# Patient Record
Sex: MALE | Race: OTHER | HISPANIC OR LATINO
[De-identification: names, ages, dates, MRNs, and addresses within clinical notes are randomized per-mention and may not be internally consistent; named-entity substitution may affect disease eponyms.]

---

## 2022-06-14 ENCOUNTER — APPOINTMENT (OUTPATIENT)
Dept: INTERNAL MEDICINE | Facility: CLINIC | Age: 52
End: 2022-06-14
Payer: MEDICAID

## 2022-06-14 ENCOUNTER — NON-APPOINTMENT (OUTPATIENT)
Age: 52
End: 2022-06-14

## 2022-06-14 VITALS
DIASTOLIC BLOOD PRESSURE: 90 MMHG | HEIGHT: 67 IN | WEIGHT: 180 LBS | SYSTOLIC BLOOD PRESSURE: 140 MMHG | BODY MASS INDEX: 28.25 KG/M2 | OXYGEN SATURATION: 98 % | HEART RATE: 90 BPM

## 2022-06-14 DIAGNOSIS — Z00.00 ENCOUNTER FOR GENERAL ADULT MEDICAL EXAMINATION W/OUT ABNORMAL FINDINGS: ICD-10-CM

## 2022-06-14 DIAGNOSIS — I10 ESSENTIAL (PRIMARY) HYPERTENSION: ICD-10-CM

## 2022-06-14 DIAGNOSIS — N63.0 UNSPECIFIED LUMP IN UNSPECIFIED BREAST: ICD-10-CM

## 2022-06-14 PROCEDURE — 99203 OFFICE O/P NEW LOW 30 MIN: CPT

## 2022-06-14 NOTE — ASSESSMENT
[FreeTextEntry1] : Concerned about the finding of a hard mass palpable and breast exam. Patient will be referred for a mammogram. In terms of the right shoulder pain I suspect this is due to tendinitis of the shoulder. He will be referred to orthopedics. Patient will call me for the results of the mammogram.

## 2022-06-14 NOTE — PHYSICAL EXAM
[No Acute Distress] : no acute distress [Well Nourished] : well nourished [Well Developed] : well developed [Well-Appearing] : well-appearing [Normal Sclera/Conjunctiva] : normal sclera/conjunctiva [PERRL] : pupils equal round and reactive to light [EOMI] : extraocular movements intact [Normal Outer Ear/Nose] : the outer ears and nose were normal in appearance [Normal Oropharynx] : the oropharynx was normal [No JVD] : no jugular venous distention [No Lymphadenopathy] : no lymphadenopathy [Supple] : supple [Thyroid Normal, No Nodules] : the thyroid was normal and there were no nodules present [No Respiratory Distress] : no respiratory distress  [No Accessory Muscle Use] : no accessory muscle use [Clear to Auscultation] : lungs were clear to auscultation bilaterally [Normal Rate] : normal rate  [Regular Rhythm] : with a regular rhythm [Normal S1, S2] : normal S1 and S2 [No Murmur] : no murmur heard [No Carotid Bruits] : no carotid bruits [No Abdominal Bruit] : a ~M bruit was not heard ~T in the abdomen [No Varicosities] : no varicosities [Pedal Pulses Present] : the pedal pulses are present [No Edema] : there was no peripheral edema [No Palpable Aorta] : no palpable aorta [No Extremity Clubbing/Cyanosis] : no extremity clubbing/cyanosis [Soft] : abdomen soft [Non Tender] : non-tender [Non-distended] : non-distended [No Masses] : no abdominal mass palpated [No HSM] : no HSM [Normal Bowel Sounds] : normal bowel sounds [Normal Posterior Cervical Nodes] : no posterior cervical lymphadenopathy [Normal Anterior Cervical Nodes] : no anterior cervical lymphadenopathy [Grossly Normal Strength/Tone] : grossly normal strength/tone [No Rash] : no rash [Coordination Grossly Intact] : coordination grossly intact [No Focal Deficits] : no focal deficits [Normal Gait] : normal gait [Normal Affect] : the affect was normal [Deep Tendon Reflexes (DTR)] : deep tendon reflexes were 2+ and symmetric [Normal Insight/Judgement] : insight and judgment were intact [de-identified] : hard mass palpable directly posterior to nipple

## 2022-07-08 ENCOUNTER — RESULT REVIEW (OUTPATIENT)
Age: 52
End: 2022-07-08

## 2023-03-06 PROBLEM — Z86.79 HISTORY OF HYPERTENSION: Status: RESOLVED | Noted: 2023-03-06 | Resolved: 2023-03-06

## 2023-03-06 PROBLEM — Z87.438 HISTORY OF BENIGN PROSTATIC HYPERPLASIA: Status: RESOLVED | Noted: 2023-03-06 | Resolved: 2023-03-06

## 2023-03-08 ENCOUNTER — APPOINTMENT (OUTPATIENT)
Dept: GASTROENTEROLOGY | Facility: CLINIC | Age: 53
End: 2023-03-08
Payer: MEDICAID

## 2023-03-08 VITALS
DIASTOLIC BLOOD PRESSURE: 82 MMHG | BODY MASS INDEX: 29.13 KG/M2 | SYSTOLIC BLOOD PRESSURE: 140 MMHG | OXYGEN SATURATION: 99 % | WEIGHT: 186 LBS | HEART RATE: 79 BPM

## 2023-03-08 DIAGNOSIS — Z86.79 PERSONAL HISTORY OF OTHER DISEASES OF THE CIRCULATORY SYSTEM: ICD-10-CM

## 2023-03-08 DIAGNOSIS — Z87.438 PERSONAL HISTORY OF OTHER DISEASES OF MALE GENITAL ORGANS: ICD-10-CM

## 2023-03-08 DIAGNOSIS — Z12.11 ENCOUNTER FOR SCREENING FOR MALIGNANT NEOPLASM OF COLON: ICD-10-CM

## 2023-03-08 DIAGNOSIS — K62.5 HEMORRHAGE OF ANUS AND RECTUM: ICD-10-CM

## 2023-03-08 PROCEDURE — 99204 OFFICE O/P NEW MOD 45 MIN: CPT

## 2023-03-08 RX ORDER — LOSARTAN POTASSIUM 50 MG/1
50 TABLET, FILM COATED ORAL
Refills: 0 | Status: ACTIVE | COMMUNITY

## 2023-03-08 RX ORDER — HYDROCHLOROTHIAZIDE 25 MG/1
25 TABLET ORAL
Refills: 0 | Status: ACTIVE | COMMUNITY

## 2023-03-08 NOTE — PHYSICAL EXAM
[Alert] : alert [Sclera] : the sclera and conjunctiva were normal [Normal Appearance] : the appearance of the neck was normal [No Respiratory Distress] : no respiratory distress [None] : no edema [Abdomen Soft] : soft [Abnormal Walk] : normal gait [Normal Color / Pigmentation] : normal skin color and pigmentation [No Focal Deficits] : no focal deficits [Oriented To Time, Place, And Person] : oriented to person, place, and time [de-identified] : Deferred pending colonoscopy

## 2023-03-08 NOTE — HISTORY OF PRESENT ILLNESS
[FreeTextEntry1] : JOSH SANTIAGORIOS  is being evaluated at the request of Dr. VIVIAN Givens for an opinion re: colon cancer screening\par Additional;ly admits  to occasional BRBPR.  Denies nausea, vomitng, fever, chills, diarrhea, constipation,. melena, hematemesis, BRBPR, unexpected weight  loss, GERD

## 2023-03-08 NOTE — CONSULT LETTER
[Dear  ___] : Dear  [unfilled], [Consult Letter:] : I had the pleasure of evaluating your patient, [unfilled]. [Please see my note below.] : Please see my note below. [Consult Closing:] : Thank you very much for allowing me to participate in the care of this patient.  If you have any questions, please do not hesitate to contact me. [Sincerely,] : Sincerely, [FreeTextEntry3] : Feliciano Stevens MD\par tel: [phone]\par fax: [phone]\par

## 2023-03-28 ENCOUNTER — RESULT REVIEW (OUTPATIENT)
Age: 53
End: 2023-03-28

## 2023-03-29 ENCOUNTER — APPOINTMENT (OUTPATIENT)
Dept: GASTROENTEROLOGY | Facility: HOSPITAL | Age: 53
End: 2023-03-29

## 2024-09-17 ENCOUNTER — APPOINTMENT (OUTPATIENT)
Dept: NEUROLOGY | Facility: CLINIC | Age: 54
End: 2024-09-17
Payer: COMMERCIAL

## 2024-09-17 VITALS
OXYGEN SATURATION: 97 % | WEIGHT: 184 LBS | BODY MASS INDEX: 28.88 KG/M2 | DIASTOLIC BLOOD PRESSURE: 82 MMHG | SYSTOLIC BLOOD PRESSURE: 129 MMHG | HEART RATE: 88 BPM | HEIGHT: 67 IN

## 2024-09-17 DIAGNOSIS — R42 DIZZINESS AND GIDDINESS: ICD-10-CM

## 2024-09-17 DIAGNOSIS — H81.10 BENIGN PAROXYSMAL VERTIGO, UNSPECIFIED EAR: ICD-10-CM

## 2024-09-17 PROCEDURE — 99205 OFFICE O/P NEW HI 60 MIN: CPT

## 2024-09-17 RX ORDER — MECLIZINE HYDROCHLORIDE 25 MG/1
25 TABLET ORAL TWICE DAILY
Qty: 20 | Refills: 0 | Status: ACTIVE | COMMUNITY
Start: 2024-09-17 | End: 1900-01-01

## 2024-09-17 NOTE — HISTORY OF PRESENT ILLNESS
[FreeTextEntry1] : Mr. JOSH DIETRICH is a 54 year old male here today in neurology consultation for symptoms of vertigo. Hx htn,    JOSH is escorted by his wife. He reports 2 weeks of dizziness. He reports arriving home from work about 2 weeks ago when he began to feel significant dizziness. The symptoms of dizziness lessened then increased again 3 days later prompting pt to go to [facility] ER; work up negative. Pt works in a restaurant. Notably pt reports hx of chronic dizziness for many years for which OTC medications (unknown which) have been helpful. Pt reports feeling like his head was moving side to side x 12 hours then became milder. No room spinning. Reports drinking lots of fluids during the day.   9/2/24 [facility] ER for dizziness. Work up was unrevealing. D/c home with meclizine prn vertigo; effective.   9/2/24 Cr 1.2, GFR 67, A1C 5.9, LDL 71, Lyme negative.  9/2/24 CT HEAD - No evidence of acute intracranial pathology.  9/2/24 CT PERFUSION:   No convincing evidence of acute core infarct or ischemic penumbra. MRI brain recommended if  symptoms persist.        9/2/24 CTA NECK:    No evidence of significant stenosis or occlusion.       9/2/24 CTA HEAD:    No large vessel occlusion, significant stenosis or vascular abnormality identified.    PCP at [facility]    9/2/2024 [facility] ED VISIT  54-year-old male presents to emergency room with complaints of acute onset of dizziness. Patient walked into emergency room. In triage noted to be weak. Code stroke was called patient was brought to room 1. History exam obtained via interpretation. Patient is following commands upon arrival to emergency room NIH score 0. Stroke protocol was followed patient underwent CT of the head, CT of cerebral arteries and neck vessels with contrast. Results were reviewed.  Emergency room patient treated with Reglan meclizine and fluids. Laboratory results were reviewed. Results were discussed with patient and wife via interpretation explained what happened to patient. Results of the stroke protocol CT were reviewed and discussed explained to patient outpatient follow-up with neurologist as recommended. Patient would benefit with outpatient stroke follow-up and workup even though at present patients presentation seems more suspicious for vertigo.

## 2024-09-17 NOTE — ADDENDUM
[FreeTextEntry1] : Chart review done. Medication reconciliation completed. Medication education provided. Chronic medical condition education reviewed. Fall and safety precautions reinforced. Instructed pt/surrogate to call office for questions or concerns. Schedule follow-up appointment as recommended. Referrals and testing ordered where indicated. Plan of care reviewed, and questions answered.

## 2024-09-17 NOTE — REASON FOR VISIT
[Consultation] : a consultation visit [Time Spent: ____ minutes] : Total time spent using  services: [unfilled] minutes. The patient's primary language is not English thus required  services. [Interpreters_IDNumber] : [id number] [Interpreters_FullName] : Anil [TWNoteComboBox1] : [place]

## 2024-09-17 NOTE — ASSESSMENT
[FreeTextEntry1] : Mr. JOSH DIETRICH is a 54 year old male here in neurology consultation for symptoms of dizziness that worsen with head movement. Pt reports experiencing mild symptoms of dizziness for many years for which he typically took OTC medications (unknown name) with improvement. A more significant episode of dizziness occurred 9/2 prompting pt to go to [facility] ER where work up was unrevealing. Meclizine prescribed was effective. Dizziness is improved though not resolved. DDx BPPV vs structural brain abnormality vs mild dehydration   PLAN OF CARE - MRI brain waw Vest[facility] rehab  Meclizine 25mg 2x daily prn dizziness  Water - goal 64 oz daily   Neurology f/u in 3 months with Brinda Mcrae NP   Recommendations for Brain Health - - healthy eating/diet with diet rich in fiber, fruits, vegetables, and low in saturated fats, processed foods. - good sleep, 7-8 hours a night. No use of phone or watching television before bed. - aerobic exercise, at least 30 minutes, such as a brisk walk 3-4 times a week. - keep mind active with puzzles, reading, and socializing with others. - abstaining from excess alcohol, or drug use. - blood pressure, cholesterol, blood glucose monitoring to prevent microvascular disease which can lead to cognitive impairment. - obtain adequate water intake. 8 cups of water daily or as recommended by your pcp if fluid restricted related to medical condition.

## 2024-09-17 NOTE — PHYSICAL EXAM
[FreeTextEntry1] : Physical Exam In NAD Mood appropriate Head turn to R and L did not trigger dizziness today  No neck tenderness    Mental Status Awake, alert and oriented to person, place, time and situation. Provides detailed history. Fluent Speech.   Cranial Nerves II: VFF III, IV, VI: PERRL, EOMI. V: Facial sensation is normal B/L. VII: Facial strength is symmetric. VIII: wnl to voice.  IX, X: Palate is midline and elevates symmetrically. XI: Trapezius normal strength. XII: Tongue midline without atrophy or fasciculations.  MOTOR EXAM Muscle tone - no evidence of rigidity or resistance in all 4 extremities. No atrophy or fasciculations. Muscle Strength: arms and legs, proximal and distal flexors and extensors are normal. No UE drift.   REFLEXES All present, normal, and symmetrical Right Plantar: mute. Left Plantar: mute.   COORDINATION Finger to nose: Normal. Heel to shin:    SENSORY Vibration: intact  Sensation: light touch, pin prick distribution and cold intact   GAIT Normal. Tandem walk normal. Romberg negative.

## 2024-09-17 NOTE — CONSULT LETTER
[Courtesy Letter:] : I had the pleasure of seeing your patient, [unfilled], in my office today. [Please see my note below.] : Please see my note below. [Sincerely,] : Sincerely, [FreeTextEntry2] : Attn [facility]  [FreeTextEntry3] : Brinda Mcrae NP

## 2024-09-17 NOTE — DATA REVIEWED
[de-identified] : 9/2/24 Cr 1.2, GFR 67, A1C 5.9, LDL 71, Lyme negative.  9/2/24 CT HEAD - No evidence of acute intracranial pathology.  9/2/24 CT PERFUSION:   No convincing evidence of acute core infarct or ischemic penumbra. MRI brain recommended if  symptoms persist.        9/2/24 CTA NECK:    No evidence of significant stenosis or occlusion.       9/2/24 CTA HEAD:    No large vessel occlusion, significant stenosis or vascular abnormality identified.

## 2024-10-02 ENCOUNTER — RESULT REVIEW (OUTPATIENT)
Age: 54
End: 2024-10-02

## 2024-10-09 ENCOUNTER — NON-APPOINTMENT (OUTPATIENT)
Age: 54
End: 2024-10-09

## 2025-03-10 ENCOUNTER — APPOINTMENT (OUTPATIENT)
Dept: CARDIOLOGY | Facility: CLINIC | Age: 55
End: 2025-03-10
Payer: COMMERCIAL

## 2025-03-10 ENCOUNTER — NON-APPOINTMENT (OUTPATIENT)
Age: 55
End: 2025-03-10

## 2025-03-10 VITALS
OXYGEN SATURATION: 98 % | WEIGHT: 192 LBS | DIASTOLIC BLOOD PRESSURE: 111 MMHG | BODY MASS INDEX: 30.13 KG/M2 | SYSTOLIC BLOOD PRESSURE: 152 MMHG | HEART RATE: 94 BPM | HEIGHT: 67 IN

## 2025-03-10 DIAGNOSIS — Z86.39 PERSONAL HISTORY OF OTHER ENDOCRINE, NUTRITIONAL AND METABOLIC DISEASE: ICD-10-CM

## 2025-03-10 DIAGNOSIS — R06.02 SHORTNESS OF BREATH: ICD-10-CM

## 2025-03-10 DIAGNOSIS — E78.5 HYPERLIPIDEMIA, UNSPECIFIED: ICD-10-CM

## 2025-03-10 DIAGNOSIS — Z87.898 PERSONAL HISTORY OF OTHER SPECIFIED CONDITIONS: ICD-10-CM

## 2025-03-10 DIAGNOSIS — R00.2 PALPITATIONS: ICD-10-CM

## 2025-03-10 DIAGNOSIS — Z82.49 FAMILY HISTORY OF ISCHEMIC HEART DISEASE AND OTHER DISEASES OF THE CIRCULATORY SYSTEM: ICD-10-CM

## 2025-03-10 DIAGNOSIS — R07.9 CHEST PAIN, UNSPECIFIED: ICD-10-CM

## 2025-03-10 DIAGNOSIS — Z80.1 FAMILY HISTORY OF MALIGNANT NEOPLASM OF TRACHEA, BRONCHUS AND LUNG: ICD-10-CM

## 2025-03-10 DIAGNOSIS — E66.3 OVERWEIGHT: ICD-10-CM

## 2025-03-10 PROCEDURE — 93246 EXT ECG>7D<15D RECORDING: CPT

## 2025-03-10 PROCEDURE — 99205 OFFICE O/P NEW HI 60 MIN: CPT

## 2025-03-10 PROCEDURE — 93000 ELECTROCARDIOGRAM COMPLETE: CPT | Mod: 59

## 2025-03-11 PROBLEM — Z87.898 HISTORY OF VERTIGO: Status: RESOLVED | Noted: 2025-03-11 | Resolved: 2025-03-11

## 2025-03-11 PROBLEM — Z82.49 FH: CABG (CORONARY ARTERY BYPASS SURGERY): Status: ACTIVE | Noted: 2025-03-11

## 2025-03-11 PROBLEM — Z86.39 HISTORY OF HYPERLIPIDEMIA: Status: RESOLVED | Noted: 2025-03-11 | Resolved: 2025-03-11

## 2025-03-11 PROBLEM — Z82.49 FAMILY HISTORY OF CORONARY ARTERY DISEASE: Status: ACTIVE | Noted: 2025-03-11

## 2025-03-11 PROBLEM — Z80.1 FAMILY HISTORY OF LUNG CANCER: Status: ACTIVE | Noted: 2025-03-11

## 2025-03-11 PROBLEM — E66.3 OVERWEIGHT: Status: ACTIVE | Noted: 2025-03-11

## 2025-03-28 ENCOUNTER — RESULT REVIEW (OUTPATIENT)
Age: 55
End: 2025-03-28

## 2025-03-29 ENCOUNTER — NON-APPOINTMENT (OUTPATIENT)
Age: 55
End: 2025-03-29

## 2025-03-29 PROCEDURE — 93248 EXT ECG>7D<15D REV&INTERPJ: CPT

## 2025-04-03 ENCOUNTER — APPOINTMENT (OUTPATIENT)
Dept: CARDIOLOGY | Facility: CLINIC | Age: 55
End: 2025-04-03
Payer: COMMERCIAL

## 2025-04-03 DIAGNOSIS — R07.9 CHEST PAIN, UNSPECIFIED: ICD-10-CM

## 2025-04-03 PROCEDURE — 93306 TTE W/DOPPLER COMPLETE: CPT

## 2025-04-03 PROCEDURE — 36415 COLL VENOUS BLD VENIPUNCTURE: CPT

## 2025-04-04 ENCOUNTER — NON-APPOINTMENT (OUTPATIENT)
Age: 55
End: 2025-04-04

## 2025-04-05 DIAGNOSIS — Z87.898 PERSONAL HISTORY OF OTHER SPECIFIED CONDITIONS: ICD-10-CM

## 2025-05-14 ENCOUNTER — APPOINTMENT (OUTPATIENT)
Dept: CARDIOLOGY | Facility: CLINIC | Age: 55
End: 2025-05-14
Payer: COMMERCIAL

## 2025-05-14 PROCEDURE — 93015 CV STRESS TEST SUPVJ I&R: CPT

## 2025-05-21 ENCOUNTER — APPOINTMENT (OUTPATIENT)
Dept: CARDIOLOGY | Facility: CLINIC | Age: 55
End: 2025-05-21